# Patient Record
Sex: FEMALE | Race: WHITE | NOT HISPANIC OR LATINO | URBAN - NONMETROPOLITAN AREA
[De-identification: names, ages, dates, MRNs, and addresses within clinical notes are randomized per-mention and may not be internally consistent; named-entity substitution may affect disease eponyms.]

---

## 2017-03-20 PROBLEM — H35.3121: Noted: 2017-03-20

## 2019-12-16 ENCOUNTER — IMPORTED ENCOUNTER (OUTPATIENT)
Dept: URBAN - NONMETROPOLITAN AREA CLINIC 1 | Facility: CLINIC | Age: 84
End: 2019-12-16

## 2019-12-16 PROCEDURE — 92134 CPTRZ OPH DX IMG PST SGM RTA: CPT

## 2019-12-16 PROCEDURE — 92014 COMPRE OPH EXAM EST PT 1/>: CPT

## 2019-12-16 NOTE — PATIENT DISCUSSION
PSEUDOPHAKIA SHELLY s DR-Stressed the importance of keeping blood sugars under control blood pressure under control and weight normalization and regular visits with PCP. -Explained the possible effects of poorly controlled diabetes and the damage that diabetes can cause to ocular health. -Patient to check HgbA1C.-Pt instructed to contact our office with any vision changes. AMD - dry-Explained dry AMD and advised that there are currently no treatments available. -Recommend pt begin AREDS 2 MVT and monitoring Amsler grid.-Amsler grid given and explained to pt. Recommend monitoring daily. Pt is to contact our office if any changes are noted.; Dr's Notes: Lisa Lewis and quin higgins. Using walker.

## 2022-04-10 ASSESSMENT — TONOMETRY
OD_IOP_MMHG: 22
OS_IOP_MMHG: 22

## 2022-04-10 ASSESSMENT — VISUAL ACUITY
OS_SC: 20/50-2
OD_SC: 20/80-1

## 2022-12-08 NOTE — PATIENT DISCUSSION
WILL SCHEDULE CAT SX OD STD LENS AND HE UNDERSTANDS HE WILL NEED GLASSES FOR NEAR VISION AND ANY UNCORRECTED ASTIGMATISM.

## 2022-12-08 NOTE — PATIENT DISCUSSION
WAS GIVEN DROP INSTRUCTION SHEETS AND UNDERSTANDS HE WILL BE ON DROPS FOR AT LEAST ONE MONTH AFTER SURGERY.

## 2023-01-27 NOTE — PATIENT DISCUSSION
PATIENT DID NOT USE DROPS YESTERDAY, START DROPS 6 TIMES TODAY AND TOMORROW GO BACK TO QID FOR A FULL MONTH.